# Patient Record
Sex: MALE | Race: WHITE | Employment: OTHER | URBAN - METROPOLITAN AREA
[De-identification: names, ages, dates, MRNs, and addresses within clinical notes are randomized per-mention and may not be internally consistent; named-entity substitution may affect disease eponyms.]

---

## 2017-09-21 ENCOUNTER — TELEPHONE (OUTPATIENT)
Dept: RHEUMATOLOGY | Facility: CLINIC | Age: 67
End: 2017-09-21

## 2017-09-21 NOTE — TELEPHONE ENCOUNTER
Called patient's Home number on file [phone] (home) and spoke to patient regarding his request to reestablish care with Dr. Garcia. Patient last saw Dr. Garcia in 2013 and states that he has not seen anyone since his last visit with Dr. Garcia. His primary care doctor is Dr. Feliciano Garrison. He is not following with anyone currently. Patient states that he is taking Celebrex and is looking to start on medications that will help control his symptoms. I let patient know that I would pass this information on to Dr. Garcia and we will give him a call back as soon as we hear from her.   Shavon Whiteside CMA  9/21/2017 12:16 PM

## 2017-09-21 NOTE — TELEPHONE ENCOUNTER
----- Message from Gwendolyn Soria LPN sent at 9/5/2017  4:58 PM CDT -----  Regarding: FW: New Pt - Used to see Dr Garcia  Contact: [phone]      ----- Message -----     From: Genie Estrada     Sent: 9/5/2017   4:11 PM       To: Adult Rheum Triage-  Subject: New Pt - Used to see Dr Garcia                   This is a new pt with RA who wants to start again as a new pt and start on the arthritis pain meds again. He used to see Dr Garcia.  Please call the pt at [phone].    Thanks - Genie    Please DO NOT send this message and/or reply back to sender.  Call Center Representatives DO NOT respond to messages.

## 2019-10-04 ENCOUNTER — HEALTH MAINTENANCE LETTER (OUTPATIENT)
Age: 69
End: 2019-10-04

## 2020-02-08 ENCOUNTER — HEALTH MAINTENANCE LETTER (OUTPATIENT)
Age: 70
End: 2020-02-08

## 2020-11-08 ENCOUNTER — HEALTH MAINTENANCE LETTER (OUTPATIENT)
Age: 70
End: 2020-11-08

## 2021-03-28 ENCOUNTER — HEALTH MAINTENANCE LETTER (OUTPATIENT)
Age: 71
End: 2021-03-28

## 2021-09-11 ENCOUNTER — HEALTH MAINTENANCE LETTER (OUTPATIENT)
Age: 71
End: 2021-09-11

## 2022-04-23 ENCOUNTER — HEALTH MAINTENANCE LETTER (OUTPATIENT)
Age: 72
End: 2022-04-23

## 2022-10-30 ENCOUNTER — HEALTH MAINTENANCE LETTER (OUTPATIENT)
Age: 72
End: 2022-10-30

## 2023-06-01 ENCOUNTER — HEALTH MAINTENANCE LETTER (OUTPATIENT)
Age: 73
End: 2023-06-01

## 2024-06-09 ENCOUNTER — HEALTH MAINTENANCE LETTER (OUTPATIENT)
Age: 74
End: 2024-06-09